# Patient Record
Sex: MALE | Race: WHITE | NOT HISPANIC OR LATINO | Employment: FULL TIME | URBAN - NONMETROPOLITAN AREA
[De-identification: names, ages, dates, MRNs, and addresses within clinical notes are randomized per-mention and may not be internally consistent; named-entity substitution may affect disease eponyms.]

---

## 2017-12-08 RX ORDER — CITALOPRAM 20 MG/1
20 TABLET ORAL DAILY
COMMUNITY
End: 2017-12-11

## 2017-12-08 RX ORDER — FLUTICASONE PROPIONATE 50 MCG
2 SPRAY, SUSPENSION (ML) NASAL DAILY
COMMUNITY
End: 2017-12-18

## 2017-12-11 ENCOUNTER — CONSULT (OUTPATIENT)
Dept: CARDIOLOGY | Facility: CLINIC | Age: 62
End: 2017-12-11

## 2017-12-11 VITALS
DIASTOLIC BLOOD PRESSURE: 62 MMHG | SYSTOLIC BLOOD PRESSURE: 100 MMHG | BODY MASS INDEX: 18.1 KG/M2 | WEIGHT: 141 LBS | OXYGEN SATURATION: 96 % | HEIGHT: 74 IN | HEART RATE: 70 BPM | RESPIRATION RATE: 18 BRPM

## 2017-12-11 DIAGNOSIS — R06.02 SOB (SHORTNESS OF BREATH): ICD-10-CM

## 2017-12-11 DIAGNOSIS — R07.2 PRECORDIAL PAIN: Primary | ICD-10-CM

## 2017-12-11 PROCEDURE — 99203 OFFICE O/P NEW LOW 30 MIN: CPT | Performed by: INTERNAL MEDICINE

## 2017-12-11 RX ORDER — ASPIRIN 81 MG/1
TABLET ORAL DAILY
COMMUNITY
Start: 2017-11-18

## 2017-12-11 NOTE — PROGRESS NOTES
"    Subjective:     Encounter Date:12/11/2017      Patient ID: Jered Escamilla is a 62 y.o. male.    Chief Complaint:Chest pain and shortness of breath  HPI  This is a 62-year-old male patient with no prior history of documented heart disease who presents to cardiology clinic complaining of an episode of prolonged chest discomfort occurring 3 weeks ago.  This occurred while at rest while he was at home.  The discomfort was diffusely across his central chest and had a pressure quality.  It did not radiate.  The discomfort had a 6/10 in intensity.  It was present continuously for over 24 hours.  He did not seek medical attention.  It has not occurred since that time.  There was no associated shortness of breath nausea vomiting or diaphoresis.  He has experienced shortness of breath both at rest and with activity.  He is currently smoking one pack of cigarettes per day.  He reports having some dizziness but no palpitations or syncope.  He reports fatigue with lack of energy and poor effort tolerance.  He indicates that doing physical activities leaves him exhausted.  He has no orthopnea PND or lower extremity edema.  He has no personal history of myocardial infarction or coronary revascularization.  His family history is strongly positive for premature coronary disease.  He is a long-term smoker.  He has no personal history of hypertension or hypercholesterolemia.  He has been told that he is a \"borderline diabetic\".  The following portions of the patient's history were reviewed and updated as appropriate: allergies, current medications, past family history, past medical history, past social history, past surgical history and problem  Review of Systems   Constitution: Positive for malaise/fatigue. Negative for chills, diaphoresis, fever, weakness, night sweats, weight gain and weight loss.   HENT: Negative for ear discharge, hearing loss, hoarse voice and nosebleeds.    Eyes: Negative for discharge, double vision, pain " and photophobia.   Cardiovascular: Positive for chest pain and dyspnea on exertion. Negative for claudication, cyanosis, irregular heartbeat, leg swelling, near-syncope, orthopnea, palpitations, paroxysmal nocturnal dyspnea and syncope.   Respiratory: Positive for shortness of breath. Negative for cough, hemoptysis, sputum production and wheezing.    Endocrine: Negative for cold intolerance, heat intolerance, polydipsia, polyphagia and polyuria.   Hematologic/Lymphatic: Negative for adenopathy and bleeding problem. Does not bruise/bleed easily.   Skin: Negative for color change, flushing, itching and rash.   Musculoskeletal: Negative for muscle cramps, muscle weakness, myalgias and stiffness.   Gastrointestinal: Negative for abdominal pain, diarrhea, hematemesis, hematochezia, nausea and vomiting.   Genitourinary: Negative for dysuria, frequency and nocturia.   Neurological: Positive for dizziness. Negative for focal weakness, loss of balance, numbness, paresthesias and seizures.   Psychiatric/Behavioral: Negative for altered mental status, hallucinations and suicidal ideas.   Allergic/Immunologic: Negative for HIV exposure, hives and persistent infections.       Current Outpatient Prescriptions:   •  ASPIRIN ADULT LOW STRENGTH 81 MG EC tablet, Daily., Disp: , Rfl:   •  Vilazodone HCl (VIIBRYD PO), Take  by mouth 2 (Two) Times a Day., Disp: , Rfl:   •  fluticasone (FLONASE) 50 MCG/ACT nasal spray, 2 sprays into each nostril Daily., Disp: , Rfl:      Objective:     Physical Exam   Constitutional: He is oriented to person, place, and time. He appears well-developed and well-nourished.   HENT:   Head: Normocephalic and atraumatic.   Mouth/Throat: Oropharynx is clear and moist.   Eyes: Conjunctivae and EOM are normal. Pupils are equal, round, and reactive to light. No scleral icterus.   Neck: Normal range of motion. Neck supple. No JVD present. No tracheal deviation present. No thyromegaly present.   Cardiovascular:  "Normal rate, regular rhythm, S1 normal, S2 normal, normal heart sounds, intact distal pulses and normal pulses.  PMI is not displaced.  Exam reveals no gallop and no friction rub.    No murmur heard.  Pulmonary/Chest: Effort normal and breath sounds normal. No respiratory distress. He has no wheezes. He has no rales.   Abdominal: Soft. Bowel sounds are normal. He exhibits no distension and no mass. There is no tenderness. There is no rebound and no guarding.   Musculoskeletal: Normal range of motion. He exhibits no edema or deformity.   Neurological: He is alert and oriented to person, place, and time. He displays normal reflexes. No cranial nerve deficit. Coordination normal.   Skin: Skin is warm and dry. No rash noted. No erythema.   Psychiatric: He has a normal mood and affect. His behavior is normal. Thought content normal.     Blood pressure 100/62, pulse 70, resp. rate 18, height 188 cm (74\"), weight 64 kg (141 lb), SpO2 96 %.   Lab Review:       Assessment:         1. Precordial pain  The patient's chest discomfort has features mostly atypical for coronary insufficiency.  He does have multiple risk factors for coronary artery disease.  The patient is unable to do treadmill exercise stress testing due to severe shortness of breath and effort intolerance.  - Stress Test With Myocardial Perfusion (1 Day)  - Adult Transthoracic Echo Complete W/ Cont if Necessary Per Protocol    2. SOB (shortness of breath)  His shortness of breath is multifactorial in etiology.  Some is certainly related to his ongoing tobacco abuse as well as tobacco-related emphysema.  This could also represent unrecognized congestive heart failure or could be an angina equivalent.  - Stress Test With Myocardial Perfusion (1 Day)  - Adult Transthoracic Echo Complete W/ Cont if Necessary Per Protocol  Procedures     Plan:       I have recommended a vasodilator nuclear stress test as well as an echocardiogram.  No changes in his medication " therapy have been made at today's visit.  The patient has been counseled regarding the essential need to discontinue cigarette smoking.  Further recommendations will be predicated on the results of his outpatient testing.

## 2017-12-18 ENCOUNTER — PREP FOR SURGERY (OUTPATIENT)
Dept: OTHER | Facility: HOSPITAL | Age: 62
End: 2017-12-18

## 2017-12-18 ENCOUNTER — OFFICE VISIT (OUTPATIENT)
Dept: GASTROENTEROLOGY | Facility: CLINIC | Age: 62
End: 2017-12-18

## 2017-12-18 VITALS
DIASTOLIC BLOOD PRESSURE: 66 MMHG | HEIGHT: 74 IN | SYSTOLIC BLOOD PRESSURE: 111 MMHG | RESPIRATION RATE: 16 BRPM | HEART RATE: 60 BPM | WEIGHT: 146 LBS | TEMPERATURE: 99.4 F | BODY MASS INDEX: 18.74 KG/M2

## 2017-12-18 DIAGNOSIS — Z12.11 COLON CANCER SCREENING: ICD-10-CM

## 2017-12-18 DIAGNOSIS — R19.7 DIARRHEA, UNSPECIFIED TYPE: Primary | Chronic | ICD-10-CM

## 2017-12-18 DIAGNOSIS — R10.13 EPIGASTRIC PAIN: Primary | ICD-10-CM

## 2017-12-18 DIAGNOSIS — R10.13 EPIGASTRIC PAIN: Chronic | ICD-10-CM

## 2017-12-18 DIAGNOSIS — R19.7 DIARRHEA, UNSPECIFIED TYPE: Primary | ICD-10-CM

## 2017-12-18 LAB
BH CV ECHO MEAS - % IVS THICK: 25 %
BH CV ECHO MEAS - % LVPW THICK: 25 %
BH CV ECHO MEAS - AO MAX PG (FULL): 0.91 MMHG
BH CV ECHO MEAS - AO MAX PG: 5 MMHG
BH CV ECHO MEAS - AO MEAN PG (FULL): 1.5 MMHG
BH CV ECHO MEAS - AO MEAN PG: 2.5 MMHG
BH CV ECHO MEAS - AO ROOT AREA (BSA CORRECTED): 1.5
BH CV ECHO MEAS - AO ROOT AREA: 6.6 CM^2
BH CV ECHO MEAS - AO ROOT DIAM: 2.9 CM
BH CV ECHO MEAS - AO V2 MAX: 106.5 CM/SEC
BH CV ECHO MEAS - AO V2 MEAN: 68.4 CM/SEC
BH CV ECHO MEAS - AO V2 VTI: 20.8 CM
BH CV ECHO MEAS - AVA(I,A): 3 CM^2
BH CV ECHO MEAS - AVA(I,D): 3 CM^2
BH CV ECHO MEAS - AVA(V,A): 3.6 CM^2
BH CV ECHO MEAS - AVA(V,D): 3.6 CM^2
BH CV ECHO MEAS - BSA(HAYCOCK): 1.8 M^2
BH CV ECHO MEAS - BSA: 1.9 M^2
BH CV ECHO MEAS - BZI_BMI: 18.1 KILOGRAMS/M^2
BH CV ECHO MEAS - BZI_METRIC_HEIGHT: 188 CM
BH CV ECHO MEAS - BZI_METRIC_WEIGHT: 64 KG
BH CV ECHO MEAS - CONTRAST EF 4CH: 69.4 ML/M^2
BH CV ECHO MEAS - EDV(CUBED): 117.6 ML
BH CV ECHO MEAS - EDV(MOD-SP4): 121 ML
BH CV ECHO MEAS - EDV(TEICH): 112.8 ML
BH CV ECHO MEAS - EF(CUBED): 73.4 %
BH CV ECHO MEAS - EF(MOD-SP4): 69.4 %
BH CV ECHO MEAS - EF(TEICH): 65.1 %
BH CV ECHO MEAS - ESV(CUBED): 31.3 ML
BH CV ECHO MEAS - ESV(MOD-SP4): 37 ML
BH CV ECHO MEAS - ESV(TEICH): 39.4 ML
BH CV ECHO MEAS - FS: 35.7 %
BH CV ECHO MEAS - IVS/LVPW: 1.3
BH CV ECHO MEAS - IVSD: 1 CM
BH CV ECHO MEAS - IVSS: 1.3 CM
BH CV ECHO MEAS - LA DIMENSION: 3.4 CM
BH CV ECHO MEAS - LA/AO: 1.2
BH CV ECHO MEAS - LV DIASTOLIC VOL/BSA (35-75): 64.6 ML/M^2
BH CV ECHO MEAS - LV IVRT: 0.13 SEC
BH CV ECHO MEAS - LV MASS(C)D: 153 GRAMS
BH CV ECHO MEAS - LV MASS(C)DI: 81.7 GRAMS/M^2
BH CV ECHO MEAS - LV MASS(C)S: 105.6 GRAMS
BH CV ECHO MEAS - LV MASS(C)SI: 56.4 GRAMS/M^2
BH CV ECHO MEAS - LV MAX PG: 4.1 MMHG
BH CV ECHO MEAS - LV MEAN PG: 1 MMHG
BH CV ECHO MEAS - LV SYSTOLIC VOL/BSA (12-30): 19.8 ML/M^2
BH CV ECHO MEAS - LV V1 MAX: 100.5 CM/SEC
BH CV ECHO MEAS - LV V1 MEAN: 49.6 CM/SEC
BH CV ECHO MEAS - LV V1 VTI: 16.2 CM
BH CV ECHO MEAS - LVIDD: 4.9 CM
BH CV ECHO MEAS - LVIDS: 3.2 CM
BH CV ECHO MEAS - LVLD AP4: 8.1 CM
BH CV ECHO MEAS - LVLS AP4: 6.5 CM
BH CV ECHO MEAS - LVOT AREA (M): 3.8 CM^2
BH CV ECHO MEAS - LVOT AREA: 3.8 CM^2
BH CV ECHO MEAS - LVOT DIAM: 2.2 CM
BH CV ECHO MEAS - LVPWD: 0.8 CM
BH CV ECHO MEAS - LVPWS: 1 CM
BH CV ECHO MEAS - MV A MAX VEL: 83.9 CM/SEC
BH CV ECHO MEAS - MV DEC SLOPE: 446.5 CM/SEC^2
BH CV ECHO MEAS - MV DEC TIME: 0.22 SEC
BH CV ECHO MEAS - MV E MAX VEL: 105 CM/SEC
BH CV ECHO MEAS - MV E/A: 1.3
BH CV ECHO MEAS - MV P1/2T MAX VEL: 107 CM/SEC
BH CV ECHO MEAS - MV P1/2T: 70.2 MSEC
BH CV ECHO MEAS - MVA P1/2T LCG: 2.1 CM^2
BH CV ECHO MEAS - MVA(P1/2T): 3.1 CM^2
BH CV ECHO MEAS - PA MAX PG: 7.1 MMHG
BH CV ECHO MEAS - PA V2 MAX: 133 CM/SEC
BH CV ECHO MEAS - RAP SYSTOLE: 10 MMHG
BH CV ECHO MEAS - RVSP: 36 MMHG
BH CV ECHO MEAS - SI(AO): 73.2 ML/M^2
BH CV ECHO MEAS - SI(CUBED): 46.1 ML/M^2
BH CV ECHO MEAS - SI(LVOT): 32.9 ML/M^2
BH CV ECHO MEAS - SI(MOD-SP4): 44.8 ML/M^2
BH CV ECHO MEAS - SI(TEICH): 39.2 ML/M^2
BH CV ECHO MEAS - SV(AO): 137.1 ML
BH CV ECHO MEAS - SV(CUBED): 86.4 ML
BH CV ECHO MEAS - SV(LVOT): 61.6 ML
BH CV ECHO MEAS - SV(MOD-SP4): 84 ML
BH CV ECHO MEAS - SV(TEICH): 73.4 ML
BH CV ECHO MEAS - TR MAX VEL: 255 CM/SEC
BH CV ECHO MEAS - TV MAX PG: 1.4 MMHG
BH CV ECHO MEAS - TV V2 MAX: 59.8 CM/SEC
LV EF 2D ECHO EST: 69 %

## 2017-12-18 PROCEDURE — 99214 OFFICE O/P EST MOD 30 MIN: CPT | Performed by: NURSE PRACTITIONER

## 2017-12-18 RX ORDER — SODIUM CHLORIDE 9 MG/ML
70 INJECTION, SOLUTION INTRAVENOUS CONTINUOUS PRN
Status: CANCELLED | OUTPATIENT
Start: 2017-12-18

## 2017-12-18 RX ORDER — RANITIDINE 150 MG/1
150 CAPSULE ORAL 2 TIMES DAILY
Qty: 60 CAPSULE | Refills: 3 | Status: SHIPPED | OUTPATIENT
Start: 2017-12-18 | End: 2018-01-17

## 2017-12-18 NOTE — PROGRESS NOTES
Chief Complaint   Patient presents with   • Diarrhea   • Abdominal Pain     The patient has a history of diarrhea for the past year or so. The patient is having 5-6 bowel movements per day. Stools are described as watery. He has taken Viberzi for the diarrhea with no improvement in the past. Currently, he is not taking anything for the diarrhea. Diarrhea does wake him at night at times. Eating does not affect the diarrhea.    The patient has a history of epigastric pain for the past 5 years or so, but the symptoms have been getting worse over the past 1 month. The pain is daily. It is a sensation of fullness. The pain can be severe at times. Eating does not necessarily affect the pain. The patient is not taking anything for the pain.    The patient denies heartburn. There is no history of difficulty swallowing. The patient denies constipation. There is no history of bright red blood per rectum or melena. The patient's last colonoscopy was in July 2016 by Dr. Felix, surgical services. Unfortunately, we do not have these results. There is no family history of colon cancer.    Diarrhea    This is a chronic problem. The current episode started more than 1 year ago. Episode frequency: 5-6 episodes per day. The problem has been unchanged. The stool consistency is described as watery. The patient states that diarrhea awakens him from sleep. Associated symptoms include abdominal pain and arthralgias. Pertinent negatives include no chills, coughing, fever, headaches, myalgias or vomiting. Nothing aggravates the symptoms. There are no known risk factors. Treatments tried: Viberzi. The treatment provided no relief.   Abdominal Pain   This is a chronic problem. Episode onset: over 5 years. The onset quality is sudden. The problem occurs daily. The problem has been unchanged. The pain is located in the epigastric region. The pain is severe. The quality of the pain is a sensation of fullness. The abdominal pain does not radiate.  Associated symptoms include arthralgias and diarrhea. Pertinent negatives include no constipation, dysuria, fever, headaches, hematuria, myalgias, nausea or vomiting. Nothing aggravates the pain. The pain is relieved by nothing. He has tried nothing for the symptoms.     Review of Systems   Constitutional: Negative for appetite change, chills, fatigue, fever and unexpected weight change.   HENT: Negative for mouth sores, nosebleeds and trouble swallowing.    Eyes: Negative for discharge and redness.   Respiratory: Negative for apnea, cough and shortness of breath.    Cardiovascular: Negative for chest pain, palpitations and leg swelling.   Gastrointestinal: Positive for abdominal pain and diarrhea. Negative for abdominal distention, anal bleeding, blood in stool, constipation, nausea and vomiting.   Endocrine: Negative for cold intolerance, heat intolerance and polydipsia.   Genitourinary: Negative for dysuria, hematuria and urgency.   Musculoskeletal: Positive for arthralgias, back pain and neck pain. Negative for joint swelling and myalgias.   Skin: Negative for rash.   Allergic/Immunologic: Negative for food allergies and immunocompromised state.   Neurological: Negative for dizziness, seizures, syncope and headaches.   Hematological: Negative for adenopathy. Does not bruise/bleed easily.   Psychiatric/Behavioral: Negative for dysphoric mood. The patient is not nervous/anxious and is not hyperactive.      Patient Active Problem List   Diagnosis   • Precordial pain   • SOB (shortness of breath)   • Diarrhea   • Epigastric pain     Past Medical History:   Diagnosis Date   • Anemia    • Anxiety    • Arthritis    • Hyperlipidemia    • Insomnia      Past Surgical History:   Procedure Laterality Date   • CARDIAC CATHETERIZATION      7-8 yrs ago in [place]   • COLONOSCOPY  07/2016   • ENDOSCOPY  07/2016   • UPPER GASTROINTESTINAL ENDOSCOPY  07/2016     Family History   Problem Relation Age of Onset   • Hypertension  "Mother    • Diabetes Mother    • Heart attack Father    • Cancer Sister    • Heart attack Brother    • Hypertension Brother    • Diabetes Brother    • Cancer Brother    • Cancer Brother    • Cancer Brother    • Stomach cancer Cousin      Social History   Substance Use Topics   • Smoking status: Current Every Day Smoker     Packs/day: 2.00     Types: Cigarettes   • Smokeless tobacco: Never Used   • Alcohol use No       Current Outpatient Prescriptions:   •  ASPIRIN ADULT LOW STRENGTH 81 MG EC tablet, Daily., Disp: , Rfl:   •  Vilazodone HCl (VIIBRYD PO), Take  by mouth 2 (Two) Times a Day., Disp: , Rfl:   •  ranitidine (ZANTAC) 150 MG capsule, Take 1 capsule by mouth 2 (Two) Times a Day for 30 days., Disp: 60 capsule, Rfl: 3    Allergies   Allergen Reactions   • Amoxil [Amoxicillin] Hives     /66  Pulse 60  Temp 99.4 °F (37.4 °C)  Resp 16  Ht 188 cm (74\")  Wt 66.2 kg (146 lb)  BMI 18.75 kg/m2    Physical Exam   Constitutional: He is oriented to person, place, and time. He appears well-developed and well-nourished. No distress.   HENT:   Head: Normocephalic and atraumatic.   Right Ear: Hearing and external ear normal.   Left Ear: Hearing and external ear normal.   Nose: Nose normal.   Mouth/Throat: Oropharynx is clear and moist and mucous membranes are normal. Mucous membranes are not pale, not dry and not cyanotic. No oral lesions. No oropharyngeal exudate.   Eyes: Conjunctivae and EOM are normal. Right eye exhibits no discharge. Left eye exhibits no discharge.   Neck: Trachea normal. Neck supple. No JVD present. No edema present. No thyroid mass and no thyromegaly present.   Cardiovascular: Normal rate, regular rhythm, S2 normal and normal heart sounds.  Exam reveals no gallop, no S3 and no friction rub.    No murmur heard.  Pulmonary/Chest: Effort normal and breath sounds normal. No respiratory distress. He exhibits no tenderness.   Abdominal: Normal appearance and bowel sounds are normal. He exhibits " no distension, no ascites and no mass. There is no splenomegaly or hepatomegaly. There is no tenderness. There is no rigidity, no rebound and no guarding. No hernia.       Vascular Status -  His exam exhibits no right foot edema. His exam exhibits no left foot edema.  Lymphadenopathy:     He has no cervical adenopathy.        Left: No supraclavicular adenopathy present.   Neurological: He is alert and oriented to person, place, and time. He has normal strength. No cranial nerve deficit or sensory deficit.   Skin: No rash noted. He is not diaphoretic. No cyanosis. No pallor. Nails show no clubbing.   Psychiatric: He has a normal mood and affect.   Nursing note and vitals reviewed.  Stigmata of chronic liver disease:  None.  Asterixis:  None.    Laboratory Results:  Upon review of records:    Dated 11/6/2017 glucose 85 BUN 16 creatinine 0.6 sodium 142 potassium 4.8 chloride 101 CO2 23 calcium 9.3 albumin 3.8 total bilirubin 0.3 alkaline phosphatase 87 AST 19 ALT 16 WBC 5.2 hemoglobin 13.6 hematocrit 41.8 platelet count 281 MCV 91 vitamin B12 465 folate 8.4 TSH 1.238 hemoglobin A1c 5.6 vitamin D 22.3    Assessment and Plan:    Jered was seen today for diarrhea and abdominal pain.    Diagnoses and all orders for this visit:    Diarrhea, unspecified type  Comments:  Differentials include microscopic colitis, underlying inflammatory bowel disease.    Epigastric pain  Comments:  Differentials include peptic ulcer disease, pancreatobiliary disease.  Orders:  -     ranitidine (ZANTAC) 150 MG capsule; Take 1 capsule by mouth 2 (Two) Times a Day for 30 days.    Colon cancer screening  Comments:  Last colonoscopy was in July 2016. Unfortunately, we do not have records. Contacted [facility], they do not have records of colonoscopy.        Plan  and Patient Instructions:  Patient Instructions   1. Antireflux measures: Avoid fried, fatty foods, alcohol, chocolate, coffee, tea,  soft drinks, peppermint and spearmint, spicy  foods, tomatoes and tomato based foods, onion based foods, and smoking. Other antireflux measures include weight reduction if overweight, avoiding tight clothing around the abdomen, elevating the head of the bed 6 inches with blocks under the head board, and don't drink or eat before going to bed and avoid lying down immediately after meals.  2. Ranitidine 150 mg 1 po twice a day.  3. Upper endoscopy-EGD: Description of the procedure, risks, benefits, alternatives and options, including nonoperative options, were discussed with the patient in detail. The patient understands and wishes to proceed.  4. Colonoscopy: Description of the procedure, risks, benefits, alternatives and options, including nonoperative options, were discussed with the patient in detail. The patient understands and wishes to proceed.    Gabby Hayward, APRN

## 2017-12-18 NOTE — PATIENT INSTRUCTIONS
1. Antireflux measures: Avoid fried, fatty foods, alcohol, chocolate, coffee, tea,  soft drinks, peppermint and spearmint, spicy foods, tomatoes and tomato based foods, onion based foods, and smoking. Other antireflux measures include weight reduction if overweight, avoiding tight clothing around the abdomen, elevating the head of the bed 6 inches with blocks under the head board, and don't drink or eat before going to bed and avoid lying down immediately after meals.  2. Ranitidine 150 mg 1 po twice a day.  3. Upper endoscopy-EGD: Description of the procedure, risks, benefits, alternatives and options, including nonoperative options, were discussed with the patient in detail. The patient understands and wishes to proceed.  4. Colonoscopy: Description of the procedure, risks, benefits, alternatives and options, including nonoperative options, were discussed with the patient in detail. The patient understands and wishes to proceed.

## 2017-12-19 PROBLEM — Z12.11 COLON CANCER SCREENING: Status: ACTIVE | Noted: 2017-12-19

## 2018-01-30 ENCOUNTER — TELEPHONE (OUTPATIENT)
Dept: GASTROENTEROLOGY | Facility: CLINIC | Age: 63
End: 2018-01-30

## 2018-01-31 RX ORDER — SULFAMETHOXAZOLE AND TRIMETHOPRIM 800; 160 MG/1; MG/1
1 TABLET ORAL 2 TIMES DAILY
COMMUNITY
End: 2018-02-19

## 2018-01-31 RX ORDER — RANITIDINE 150 MG/1
150 TABLET ORAL NIGHTLY
COMMUNITY
End: 2018-02-05 | Stop reason: HOSPADM

## 2018-02-05 ENCOUNTER — HOSPITAL ENCOUNTER (OUTPATIENT)
Facility: HOSPITAL | Age: 63
Setting detail: HOSPITAL OUTPATIENT SURGERY
Discharge: HOME | End: 2018-02-05
Attending: INTERNAL MEDICINE | Admitting: INTERNAL MEDICINE

## 2018-02-05 ENCOUNTER — ANESTHESIA (OUTPATIENT)
Dept: GASTROENTEROLOGY | Facility: HOSPITAL | Age: 63
End: 2018-02-05

## 2018-02-05 ENCOUNTER — ANESTHESIA EVENT (OUTPATIENT)
Dept: GASTROENTEROLOGY | Facility: HOSPITAL | Age: 63
End: 2018-02-05

## 2018-02-05 VITALS
OXYGEN SATURATION: 98 % | TEMPERATURE: 97.2 F | HEIGHT: 74 IN | WEIGHT: 145 LBS | RESPIRATION RATE: 18 BRPM | BODY MASS INDEX: 18.61 KG/M2 | SYSTOLIC BLOOD PRESSURE: 112 MMHG | DIASTOLIC BLOOD PRESSURE: 63 MMHG | HEART RATE: 62 BPM

## 2018-02-05 DIAGNOSIS — R19.7 DIARRHEA, UNSPECIFIED TYPE: ICD-10-CM

## 2018-02-05 DIAGNOSIS — Z12.11 COLON CANCER SCREENING: ICD-10-CM

## 2018-02-05 LAB — GLUCOSE BLDC GLUCOMTR-MCNC: 89 MG/DL (ref 70–130)

## 2018-02-05 PROCEDURE — 45385 COLONOSCOPY W/LESION REMOVAL: CPT | Performed by: INTERNAL MEDICINE

## 2018-02-05 PROCEDURE — 43239 EGD BIOPSY SINGLE/MULTIPLE: CPT | Performed by: INTERNAL MEDICINE

## 2018-02-05 PROCEDURE — 45380 COLONOSCOPY AND BIOPSY: CPT | Performed by: INTERNAL MEDICINE

## 2018-02-05 PROCEDURE — 82962 GLUCOSE BLOOD TEST: CPT

## 2018-02-05 PROCEDURE — S0260 H&P FOR SURGERY: HCPCS | Performed by: INTERNAL MEDICINE

## 2018-02-05 PROCEDURE — 25010000002 PROPOFOL 10 MG/ML EMULSION: Performed by: NURSE ANESTHETIST, CERTIFIED REGISTERED

## 2018-02-05 RX ORDER — SODIUM CHLORIDE 9 MG/ML
70 INJECTION, SOLUTION INTRAVENOUS CONTINUOUS PRN
Status: DISCONTINUED | OUTPATIENT
Start: 2018-02-05 | End: 2018-02-05 | Stop reason: HOSPADM

## 2018-02-05 RX ORDER — PANTOPRAZOLE SODIUM 40 MG/1
TABLET, DELAYED RELEASE ORAL
Qty: 30 TABLET | Refills: 2 | Status: SHIPPED | OUTPATIENT
Start: 2018-02-05

## 2018-02-05 RX ORDER — PROPOFOL 10 MG/ML
VIAL (ML) INTRAVENOUS AS NEEDED
Status: DISCONTINUED | OUTPATIENT
Start: 2018-02-05 | End: 2018-02-05 | Stop reason: SURG

## 2018-02-05 RX ORDER — ONDANSETRON 2 MG/ML
4 INJECTION INTRAMUSCULAR; INTRAVENOUS ONCE AS NEEDED
Status: CANCELLED | OUTPATIENT
Start: 2018-02-05 | End: 2018-02-05

## 2018-02-05 RX ADMIN — PROPOFOL 50 MG: 10 INJECTION, EMULSION INTRAVENOUS at 12:10

## 2018-02-05 RX ADMIN — PROPOFOL 50 MG: 10 INJECTION, EMULSION INTRAVENOUS at 11:57

## 2018-02-05 RX ADMIN — PROPOFOL 50 MG: 10 INJECTION, EMULSION INTRAVENOUS at 12:05

## 2018-02-05 RX ADMIN — PROPOFOL 50 MG: 10 INJECTION, EMULSION INTRAVENOUS at 12:03

## 2018-02-05 RX ADMIN — LIDOCAINE HYDROCHLORIDE 100 MG: 20 INJECTION, SOLUTION INTRAVENOUS at 11:39

## 2018-02-05 RX ADMIN — PROPOFOL 50 MG: 10 INJECTION, EMULSION INTRAVENOUS at 12:20

## 2018-02-05 RX ADMIN — PROPOFOL 50 MG: 10 INJECTION, EMULSION INTRAVENOUS at 11:44

## 2018-02-05 RX ADMIN — PROPOFOL 100 MG: 10 INJECTION, EMULSION INTRAVENOUS at 11:39

## 2018-02-05 RX ADMIN — PROPOFOL 50 MG: 10 INJECTION, EMULSION INTRAVENOUS at 11:50

## 2018-02-05 RX ADMIN — SODIUM CHLORIDE 70 ML/HR: 9 INJECTION, SOLUTION INTRAVENOUS at 09:15

## 2018-02-05 RX ADMIN — PROPOFOL 50 MG: 10 INJECTION, EMULSION INTRAVENOUS at 12:15

## 2018-02-05 NOTE — OP NOTE
PROCEDURE:  Upper Endoscopy with biopsies.    DATE OF PROCEDURE: February 5, 2018.    REFERRING PROVIDER:  TABITHA Schneider.     INSTRUMENT: Olympus GIF H 190 video endoscope     INDICATIONS OF THE PROCEDURE:  This is a 62-year-old white male with recurrent epigastric abdominal pain.       BIOPSIES: Second portion of duodenum.  Gastric antrum, angularis and body of the stomach.       MEDICATIONS:  MAC.     PHOTOGRAPHS:  Photographs were included in the medical records.     CONSENT/PREPROCEDURE EVALUATION:  Risks, benefits, alternatives and options of the procedure including risks of anesthesia/sedation were discussed and informed consent was obtained prior to the procedure. History and physical examination were performed and nothing precluded the test.     REPORT:  The patient was placed in left lateral decubitus position. Once under the influence of IV sedation, the instrument was inserted into the mouth and esophagus was intubated under direct vision without difficulty.    A small 3 mm polypoid area was noted in the right pyriform sinus.     Esophagus:  Z line was noted to be around 46 cm.    A small sliding hiatal hernia less than 3 cm was noted.  No Lind's esophagus was seen. Lower esophageal sphincter area was noted to be spastic. On persistent insufflation this finally opens.     Stomach:  Antrum:  Erythematous gastritis.  Angulus, lesser and greater curves: Normal.  Retroflex examination: Sliding hiatal hernia.  Cardia and fundus:  Normal.     Body of the stomach: Erythematous-erosive gastritis.  Good distensibility of the stomach was achieved no giant folds were noted.  Nodularity was noted at the upper body.  Biopsies were obtained from the gastric antrum, angularis and body of the stomach.    Pylorus and pyloric channel:  normal.     Duodenum:  Bulb: Normal.  Second portion: normal.  No scalloping was seen in the second portion of duodenum.  Biopsies were obtained from the second portion of  duodenum.    Intervention:  None.       The upper GI tract was decompressed and the scope was pulled out of the patient. The patient tolerated the procedure well.     DIAGNOSES:     1. Small 2 mm polypoid area in the right performed sinus.  2. Small sliding hiatal hernia less than 3 cm.  3. Erythematous-erosive gastritis with nodularity at the upper body.  4. Good distensibility of the stomach was achieved. No giant folds were noted.  5. Lower esophageal sphincter area was noted to be spastic. On persistent insufflation was finally opens.    RECOMMENDATIONS:  1.  Dietary instructions.  2.  A trial of Pantoprazole 40 mg 1 p.o. q.a.m. 1/2 hour before breakfast. Hold Zantac.  3.  Follow biopsies.  4.  Follow up in office.   5. Possible further evaluation by ENT-small polyp in the right pyriform sinus.       Thank you very much for letting me participate in the care of this patient. Please do not hesitate to call me if you have any questions.

## 2018-02-05 NOTE — ANESTHESIA POSTPROCEDURE EVALUATION
Patient: Jered Escamilla    Procedure Summary     Date Anesthesia Start Anesthesia Stop Room / Location    02/05/18 1132 1239 [facility] ENDOSCOPY 2 / [facility] ENDOSCOPY       Procedure Diagnosis Surgeon Provider    COLONOSCOPY with cold snare polypectomy, cold biopsy polypectomy, and  biopsies (N/A Anus); ESOPHAGOGASTRODUODENOSCOPY with biopsies (N/A Esophagus) Diverticulosis of colon; Angiodysplasia; Colon polyp; Internal hemorrhoids; Gastritis; Hiatal hernia  (Colon cancer screening [Z12.11]; Diarrhea, unspecified type [R19.7]) MD Neville Luis CRNA          Anesthesia Type: MAC  Last vitals  BP   105/63 (02/05/18 1244)   Temp   97.2 °F (36.2 °C) (02/05/18 1244)   Pulse   59 (02/05/18 1244)   Resp   18 (02/05/18 1244)     SpO2   99 % (02/05/18 1244)     Post Anesthesia Care and Evaluation    Patient location during evaluation: PACU  Patient participation: complete - patient participated  Level of consciousness: awake  Pain score: 1  Pain management: adequate  Airway patency: patent  Anesthetic complications: No anesthetic complications  PONV Status: controlled  Cardiovascular status: acceptable and stable  Respiratory status: acceptable and nasal cannula  Hydration status: acceptable

## 2018-02-05 NOTE — ANESTHESIA PREPROCEDURE EVALUATION
Anesthesia Evaluation     Patient summary reviewed and Nursing notes reviewed   no history of anesthetic complications:  NPO Solid Status: > 8 hours  NPO Liquid Status: > 8 hours     Airway   Mallampati: I  TM distance: >3 FB  Neck ROM: full  no difficulty expected  Dental - normal exam   (+) edentulous    Pulmonary - normal exam    breath sounds clear to auscultation  (+) a smoker Current Smoked day of surgery, shortness of breath,   Cardiovascular - negative cardio ROS and normal exam    ECG reviewed  Rhythm: regular  Rate: normal      ROS comment: ECHO Dec 2017  · Mild tricuspid valve regurgitation is present.  · Left ventricular systolic function is normal. Estimated EF = 69%  EKG SB    Neuro/Psych  (+) psychiatric history Anxiety and Depression,     GI/Hepatic/Renal/Endo - negative ROS     Musculoskeletal     Abdominal    Substance History - negative use     OB/GYN negative ob/gyn ROS         Other   (+) arthritis     ROS/Med Hx Other: fsbs 89                                        Anesthesia Plan    ASA 3     MAC   (Pt told that intravenous sedation will be used as the primary anesthetic. Every effort will be made to make sure the patient is comfortable.     The patient was told they may or may not have recall for the procedure.  Will proceed with the plan of care.)  intravenous induction   Anesthetic plan and risks discussed with patient.

## 2018-02-05 NOTE — OP NOTE
PROCEDURE:  Colonoscopy to the terminal ileum with one cold snare polypectomy and biopsies.     DATE OF PROCEDURE:  February 5, 2018    REFERRING PROVIDER:  TABITHA Schneider     INSTRUMENT USED: Olympus PCF H 190 videocolonoscope.      INDICATIONS OF THE PROCEDURE:  This is a 62-year-old white male for colon cancer screening. There is history of recurrent diarrhea.      BIOPSIES: Biopsies were obtained from the terminal ileum. Random biopsies were obtained from the colon including rectum. Multiple biopsies were obtained from submucosal fullness. Small polyp in the rectum.         PHOTOGRAPHS:  Photographs were included in the medical records.     MEDICATIONS:  MAC.       CONSENT/PREPROCEDURE EVALUATION:  Risks, benefits, alternatives and options of the procedure including risks of sedation/anesthesia were discussed with the patient and informed consent was obtained prior to the procedure.  History and physical examination were performed and nothing precluded the test.      REPORT:  The patient was placed in left lateral decubitus position and a digital examination was performed.  Once under the influence of IV sedation, the instrument was inserted into the rectum and advanced under direct vision to cecum which was identified by the ileocecal valve, triradiate folds and appendiceal orifice. The scope was then maneuvered into the terminal ileum.        FINDINGS:      Digital rectal examination:  Good anal tone.  No perianal pathology.  No mass.        Terminal ileum:  7-8 cm.   Mucosa appeared to be flat. Biopsies were obtained.      Cecum and ascending colon: 1 cm submucosal fullness was noted in the cecum at the appendicular area. This was palpated with biopsy forceps and appeared to be soft. Multiple biopsies were obtained in this area. Nonbleeding vascular ectasia were seen.         Hepatic flexure, transverse colon, splenic flexure:  Nonbleeding vascular ectasia were seen.  Normal.         Descending  colon, sigmoid colon and rectum:  Diverticulosis was noted. A 5 mm sessile polyps in the rectum was removed with cold snare.  No endoscopic evidence of colitis was seen.  Random biopsies were obtained from the colon upon withdrawal of the scope. A retroflex examination within the rectum revealed internal hemorrhoids.        The scope was then straightened, the lower GI tract was decompressed, and the scope was pulled out of the patient.  The patient tolerated the procedure well.  There were no immediate complications and the patient was transferred in stable condition for post procedure observation.      TECHNICAL DATA:   1. [place] prep score: 8 (3+2+3).    2. Anus to cecal time: 7  minutes.  3. Difficulty of examination: 31 Average.    4. Withdrawal time: 8 minutes.  5. Procedure time: 14 minutes  6. Retroflex examination in right colon: Yes.    7. Second look Rectum to cecum with decompression: Yes.    DIAGNOSES:    1. Left-sided diverticulosis.   2. Colon polyp.   3. Nonbleeding vascular ectasia.   4. 1 cm submucosal fullness in the cecum-ad perpendicular area, soft in consistency.  5. Internal hemorrhoids.  6. No endoscopic evidence of colitis was seen.  Random biopsies were obtained from the colon upon withdrawal of the scope.    RECOMMENDATIONS:     1. Dietary instructions.  2. Follow biopsies.    3. Follow-up:    3-4 weeks.    4. Followup colonoscopy:  5 years.          Thank you very much for letting me participate in the care of this patient. Please do not hesitate to call me if you have any questions.

## 2018-02-05 NOTE — PLAN OF CARE
Problem: GI Endoscopy (Adult)  Goal: Signs and Symptoms of Listed Potential Problems Will be Absent or Manageable (GI Endoscopy)  Outcome: Outcome(s) achieved Date Met: 02/05/18 02/05/18 1253   GI Endoscopy   Problems Assessed (GI Endoscopy) all   Problems Present (GI Endoscopy) none

## 2018-02-05 NOTE — DISCHARGE INSTRUCTIONS
"Postprocedure instructions:    Nothing by mouth to fully alert.  Once fully alert may have clear liquid diet.  Advance diet as tolerated.  Vital signs as routine.    Diet:     Avoid Dairy Products.    Blood Thinner Directions:    Avoid Aspirin & other NSAIDS for _7__ days. Tylenol is okay.    Treatments:    May take \"Imodium - AD\" over-the-counter.Take 1/4 tablet at night orally. The dose may be increased to 1/2 tablet at night or even twice a day if needed.   Adjust the dose to have 1-2 soft stools a day.      Other Instructions:    Call [facility] at [phone] or come to the Emergency Department if you experience the following: Chest pain, abdominal pain, bleeding (vomiting of blood or coffee colored material, black stools or miguel blood in stools), fever/chills, nausea and vomiting or dizziness.      Follow-up:  DR. AGUSTINA MAST in 4 weeks.Office phone # [phone].    Follow-up colonoscopy: in 5 years.          To assist you in voiding:  Drink plenty of fluids  Listen to running water while attempting to void.    If you are unable to urinate and you have an uncomfortable urge to void or it has been   6 hours since you were discharged, return to the Emergency Room  "

## 2018-02-05 NOTE — H&P
Chief complaint:  Colon Cancer Screen, Diarrhea, Epigastric pain    History of present illness:     There is no history of:  Nausea, Vomiting, Reflux, Dysphagia, BH Change, Constipation, Hematemesis, Melena, BRBPR, Pancreatic or Liver Disease.    Past medical history:   Past Medical History:   Diagnosis Date   • Anemia    • Anxiety    • Arthritis    • Diabetes mellitus     BOARDERLINE NOT MEDS   • Hyperlipidemia    • Insomnia        Surgical history:    Past Surgical History:   Procedure Laterality Date   • CARDIAC CATHETERIZATION      7-8 yrs ago in [place]     Social history:   ETOH: No  Tobacco Use:  Yes  Other Notes:    Allergies:  Amoxil [amoxicillin]    Latex allergy: None  Contrast allergy: None    Medications:  Prescriptions Prior to Admission   Medication Sig Dispense Refill Last Dose   • ASPIRIN ADULT LOW STRENGTH 81 MG EC tablet Daily.   Past Week at Unknown time   • raNITIdine (ZANTAC) 150 MG tablet Take 150 mg by mouth Every Night.   2/4/2018 at Unknown time   • sulfamethoxazole-trimethoprim (BACTRIM DS,SEPTRA DS) 800-160 MG per tablet Take 1 tablet by mouth 2 (Two) Times a Day.   2/4/2018 at 0800   • Vilazodone HCl (VIIBRYD PO) Take  by mouth 2 (Two) Times a Day.   Taking       Review of systems:   Constitutional: No recent:  Fever, Weight loss or Night sweats, no Glaucoma.  Respiratory: No recent: Hemoptysis, Cough, or Sputum.    No Asthma, COPD or VICKEY.  Cardiovascular: No Recent: Chest Pains, Orthopnea, PND, Palpitations or MI.     No history of: HTN, CAD, MI, CHF, VHD, RHD, PVD, or Arrhythmia.  Endocrine: No history of: DM, Hypothyroidism or Hyperthyroidism.  Genitourinary: No history of: Renal Failure, Kidney Stones, Recent UTI; No BPH.  Musculoskeletal: No history of: RA, Gout, SLE or Fibromyalgia.  Neurological: No history of: Dementia, Migraines, RLS, Recent Seizures, CVA, TIA.   Hem. Oncology: No history of: Known Cancer.  Psychiatric: No history of: Depression.    VITAL SIGNS:    Blood pressure  "123/66, pulse 68, temperature 98.4 °F (36.9 °C), temperature source Temporal Artery , resp. rate 16, height 188 cm (74\"), weight 65.8 kg (145 lb), SpO2 99 %.    PHYSICAL EXAMINATION:   HEENT: Normal.   Lungs: Clear to auscultation.  Heart: No S3, no murmur.    Abdomen: Soft.  BS+ ND, NT  Extremities: No edema.  No cyanosis.  Neuro: Alert X 3. No focal deficit.    Assessment: Colon Cancer Screen, Diarrhea, Epigastric pain    Plan:  Colonoscopy/Upper Endoscopy     Risks/Benefits:  The potential benefits, risk and/or side effects of the procedure and alternatives have been discussed with the patient/authorized representative and questions were answered. \    "

## 2018-02-05 NOTE — PLAN OF CARE
Problem: GI Endoscopy (Adult)  Goal: Signs and Symptoms of Listed Potential Problems Will be Absent or Manageable (GI Endoscopy)  Outcome: Ongoing (interventions implemented as appropriate)   02/05/18 0900   GI Endoscopy   Problems Assessed (GI Endoscopy) all   Problems Present (GI Endoscopy) none

## 2018-02-08 ENCOUNTER — TELEPHONE (OUTPATIENT)
Dept: GASTROENTEROLOGY | Facility: CLINIC | Age: 63
End: 2018-02-08

## 2018-02-08 LAB
LAB AP CASE REPORT: NORMAL
Lab: NORMAL
PATH REPORT.FINAL DX SPEC: NORMAL

## 2018-02-19 ENCOUNTER — OFFICE VISIT (OUTPATIENT)
Dept: GASTROENTEROLOGY | Facility: CLINIC | Age: 63
End: 2018-02-19

## 2018-02-19 VITALS
HEIGHT: 74 IN | DIASTOLIC BLOOD PRESSURE: 112 MMHG | RESPIRATION RATE: 16 BRPM | HEART RATE: 76 BPM | WEIGHT: 142 LBS | SYSTOLIC BLOOD PRESSURE: 134 MMHG | BODY MASS INDEX: 18.22 KG/M2 | TEMPERATURE: 97.9 F

## 2018-02-19 DIAGNOSIS — K63.89 MASS OF CECUM: ICD-10-CM

## 2018-02-19 DIAGNOSIS — Z87.891 HISTORY OF SMOKING: ICD-10-CM

## 2018-02-19 DIAGNOSIS — R59.1 LYMPHADENOPATHY: ICD-10-CM

## 2018-02-19 DIAGNOSIS — K76.9 LIVER LESION: Primary | ICD-10-CM

## 2018-02-19 DIAGNOSIS — R19.7 DIARRHEA, UNSPECIFIED TYPE: ICD-10-CM

## 2018-02-19 DIAGNOSIS — R07.9 CHEST PAIN, UNSPECIFIED TYPE: ICD-10-CM

## 2018-02-19 PROCEDURE — 99215 OFFICE O/P EST HI 40 MIN: CPT | Performed by: INTERNAL MEDICINE

## 2018-02-19 RX ORDER — ONDANSETRON 4 MG/1
TABLET, ORALLY DISINTEGRATING ORAL
Refills: 0 | COMMUNITY
Start: 2018-02-18

## 2018-02-19 RX ORDER — HYDROCODONE BITARTRATE AND ACETAMINOPHEN 7.5; 325 MG/1; MG/1
TABLET ORAL
Refills: 0 | COMMUNITY
Start: 2018-02-18

## 2018-02-19 RX ORDER — CYCLOBENZAPRINE HCL 10 MG
TABLET ORAL
COMMUNITY
Start: 2018-02-16

## 2018-02-19 RX ORDER — GABAPENTIN 300 MG/1
CAPSULE ORAL
COMMUNITY
Start: 2018-02-16

## 2018-02-22 ENCOUNTER — TELEPHONE (OUTPATIENT)
Dept: GASTROENTEROLOGY | Facility: CLINIC | Age: 63
End: 2018-02-22

## 2018-02-22 ENCOUNTER — LAB (OUTPATIENT)
Dept: LAB | Facility: HOSPITAL | Age: 63
End: 2018-02-22
Attending: INTERNAL MEDICINE

## 2018-02-22 ENCOUNTER — HOSPITAL ENCOUNTER (OUTPATIENT)
Dept: CT IMAGING | Facility: HOSPITAL | Age: 63
Discharge: HOME | End: 2018-02-22
Attending: INTERNAL MEDICINE | Admitting: INTERNAL MEDICINE

## 2018-02-22 DIAGNOSIS — R59.1 LYMPHADENOPATHY: ICD-10-CM

## 2018-02-22 DIAGNOSIS — K63.89 MASS OF CECUM: ICD-10-CM

## 2018-02-22 DIAGNOSIS — Z87.891 HISTORY OF SMOKING: ICD-10-CM

## 2018-02-22 DIAGNOSIS — R19.7 DIARRHEA, UNSPECIFIED TYPE: ICD-10-CM

## 2018-02-22 DIAGNOSIS — K76.9 LIVER LESION: ICD-10-CM

## 2018-02-22 LAB
CEA SERPL-MCNC: 2.51 NG/ML
CREAT BLD-MCNC: 0.7 MG/DL (ref 0.6–1.3)
GFR SERPL CREATININE-BSD FRML MDRD: 115 ML/MIN/1.73

## 2018-02-22 PROCEDURE — 0 IOPAMIDOL 61 % SOLUTION: Performed by: INTERNAL MEDICINE

## 2018-02-22 PROCEDURE — 82784 ASSAY IGA/IGD/IGG/IGM EACH: CPT

## 2018-02-22 PROCEDURE — 83516 IMMUNOASSAY NONANTIBODY: CPT

## 2018-02-22 PROCEDURE — 86316 IMMUNOASSAY TUMOR OTHER: CPT

## 2018-02-22 PROCEDURE — 86301 IMMUNOASSAY TUMOR CA 19-9: CPT

## 2018-02-22 PROCEDURE — 71260 CT THORAX DX C+: CPT

## 2018-02-22 PROCEDURE — 82575 CREATININE CLEARANCE TEST: CPT

## 2018-02-22 PROCEDURE — 82378 CARCINOEMBRYONIC ANTIGEN: CPT

## 2018-02-22 PROCEDURE — 36415 COLL VENOUS BLD VENIPUNCTURE: CPT

## 2018-02-22 PROCEDURE — 83497 ASSAY OF 5-HIAA: CPT

## 2018-02-22 PROCEDURE — 81050 URINALYSIS VOLUME MEASURE: CPT

## 2018-02-22 PROCEDURE — 82565 ASSAY OF CREATININE: CPT | Performed by: INTERNAL MEDICINE

## 2018-02-22 RX ADMIN — IOPAMIDOL 100 ML: 612 INJECTION, SOLUTION INTRAVENOUS at 09:00

## 2018-02-22 NOTE — TELEPHONE ENCOUNTER
----- Message from Eugenia Cortes MA sent at 2/22/2018  9:34 AM EST -----  MR COYLE WAS WONDERING IF WE COULD ORDER A LIVER BIOPSY BEFORE HIS FUP NEXT WEEK. HE REALLY WANTS TO GO AHEAD AND GET IT DONE.

## 2018-02-22 NOTE — TELEPHONE ENCOUNTER
Attempted to call patient.  Per Dr. Carr, patient needs to do his CT of the chest and labs and FUP before doing liver biopsy.

## 2018-02-23 LAB
CANCER AG19-9 SERPL-ACNC: 45 U/ML (ref 0–35)
IGA SERPL-MCNC: 517 MG/DL (ref 61–437)
TTG IGA SER-ACNC: <2 U/ML (ref 0–3)

## 2018-02-25 LAB
COLLECT DURATION TIME UR: 24 HRS
CREAT CL 24H UR+SERPL-VRATE: 120.3 ML/MIN (ref 97–137)
CREAT CL 24H UR+SERPL-VRATE: 173.2 L/24 HR (ref 139.7–197.3)
CREAT UR-MCNC: 61 MG/DL
CREATINE 24H UR-MRATE: 1.32 G/24 HR (ref 0.8–2)

## 2018-02-25 PROCEDURE — 83497 ASSAY OF 5-HIAA: CPT

## 2018-02-25 PROCEDURE — 81050 URINALYSIS VOLUME MEASURE: CPT

## 2018-02-25 PROCEDURE — 82575 CREATININE CLEARANCE TEST: CPT

## 2018-02-27 LAB — CGA SERPL-SCNC: 92 NMOL/L (ref 0–5)

## 2018-03-01 ENCOUNTER — OFFICE VISIT (OUTPATIENT)
Dept: GASTROENTEROLOGY | Facility: CLINIC | Age: 63
End: 2018-03-01

## 2018-03-01 VITALS
TEMPERATURE: 97.5 F | RESPIRATION RATE: 16 BRPM | DIASTOLIC BLOOD PRESSURE: 69 MMHG | WEIGHT: 137 LBS | HEART RATE: 77 BPM | BODY MASS INDEX: 17.58 KG/M2 | SYSTOLIC BLOOD PRESSURE: 130 MMHG | HEIGHT: 74 IN

## 2018-03-01 DIAGNOSIS — R10.13 EPIGASTRIC PAIN: ICD-10-CM

## 2018-03-01 DIAGNOSIS — K63.89 MASS OF CECUM: ICD-10-CM

## 2018-03-01 DIAGNOSIS — K76.9 LIVER LESION: ICD-10-CM

## 2018-03-01 DIAGNOSIS — M54.50 ACUTE MIDLINE LOW BACK PAIN WITHOUT SCIATICA: Primary | ICD-10-CM

## 2018-03-01 DIAGNOSIS — R19.7 DIARRHEA, UNSPECIFIED TYPE: ICD-10-CM

## 2018-03-01 DIAGNOSIS — R35.1 NOCTURIA: ICD-10-CM

## 2018-03-01 LAB
5OH-INDOLEACETATE 24H UR-MCNC: 94.1 MG/L
5OH-INDOLEACETATE 24H UR-MRATE: 203.3 MG/24 HR (ref 0–14.9)

## 2018-03-01 PROCEDURE — 99215 OFFICE O/P EST HI 40 MIN: CPT | Performed by: INTERNAL MEDICINE

## 2018-03-01 RX ORDER — FENTANYL 50 UG/H
PATCH TRANSDERMAL
Refills: 0 | COMMUNITY
Start: 2018-02-26

## 2018-03-01 RX ORDER — IBUPROFEN 800 MG/1
TABLET ORAL
COMMUNITY
Start: 2018-02-23

## 2018-03-01 NOTE — PROGRESS NOTES
Chief Complaint   Patient presents with   • Abdominal Pain       History of Present Illness     The patient has a history of epigastric pain for the past 5 years or so, but the symptoms have been getting worse over the past 1 month. The pain is daily. It is a sensation of fullness. The pain can be severe at times. Eating does not necessarily affect the pain. The pain is somewhat improved.    The patient has a history of diarrhea for the past I year. The patient is having 5-6 bowel movements per day. Stools are described as watery. He has taken Viberzi for the diarrhea with no improvement in the past. Currently, he is not taking anything for the diarrhea. Diarrhea does wake him at night at times. Eating does not affect the diarrhea.the diarrhea is also somewhat better. Currently the patient has been having about 3-4 loose stools a day. There is history of 11 pound weight loss over the last 3 months. This is unintentional.     The patient denies heartburn. There is no history of difficulty swallowing. The patient denies constipation. There is no history of bright red blood per rectum or melena. The patient denies fever or chills. There is history of chronic back pain. Recently this has worsened. The patient also complain weakness in his legs.    Review of Systems   Constitutional: Positive for fatigue. Negative for appetite change, chills, fever and unexpected weight change.   HENT: Negative for mouth sores, nosebleeds and trouble swallowing.    Eyes: Negative for discharge and redness.   Respiratory: Negative for apnea, cough and shortness of breath.    Cardiovascular: Negative for chest pain, palpitations and leg swelling.   Gastrointestinal: Positive for abdominal pain and diarrhea. Negative for abdominal distention, anal bleeding, blood in stool, constipation, nausea and vomiting.   Endocrine: Negative for cold intolerance, heat intolerance and polydipsia.   Genitourinary: Negative for dysuria, hematuria and  urgency.   Musculoskeletal: Positive for arthralgias and back pain. Negative for joint swelling and myalgias.   Skin: Negative for rash.   Allergic/Immunologic: Negative for food allergies and immunocompromised state.   Neurological: Negative for dizziness, seizures, syncope and headaches.   Hematological: Negative for adenopathy. Does not bruise/bleed easily.   Psychiatric/Behavioral: Negative for dysphoric mood. The patient is not nervous/anxious and is not hyperactive.      Patient Active Problem List   Diagnosis   • Precordial pain   • SOB (shortness of breath)   • Diarrhea   • Epigastric pain   • Colon cancer screening     Past Medical History:   Diagnosis Date   • Anemia    • Anxiety    • Arthritis    • Diabetes mellitus     BOARDERLINE NOT MEDS   • Hyperlipidemia    • Insomnia      Past Surgical History:   Procedure Laterality Date   • CARDIAC CATHETERIZATION      7-8 yrs ago in [place]   • COLONOSCOPY  07/2016   • COLONOSCOPY N/A 2/5/2018    Procedure: COLONOSCOPY with cold snare polypectomy, cold biopsy polypectomy, and  biopsies;  Surgeon: Wesley Carr MD;  Location: [facility] ENDOSCOPY;  Service:    • ENDOSCOPY  07/2016   • ENDOSCOPY N/A 2/5/2018    Procedure: ESOPHAGOGASTRODUODENOSCOPY with biopsies;  Surgeon: Wesley Carr MD;  Location: [facility] ENDOSCOPY;  Service:    • UPPER GASTROINTESTINAL ENDOSCOPY  07/2016     Family History   Problem Relation Age of Onset   • Hypertension Mother    • Diabetes Mother    • Heart attack Father    • Cancer Sister    • Heart attack Brother    • Hypertension Brother    • Diabetes Brother    • Cancer Brother    • Cancer Brother    • Cancer Brother    • Stomach cancer Cousin      Social History   Substance Use Topics   • Smoking status: Current Every Day Smoker     Packs/day: 2.00     Years: 50.00     Types: Cigarettes   • Smokeless tobacco: Never Used   • Alcohol use No       Current Outpatient Prescriptions:   •  ASPIRIN ADULT LOW STRENGTH 81 MG EC tablet, Daily., Disp:  ", Rfl:   •  cyclobenzaprine (FLEXERIL) 10 MG tablet, , Disp: , Rfl:   •  fentaNYL (DURAGESIC) 50 MCG/HR patch, apply 1 patch every 72 hours, Disp: , Rfl: 0  •  gabapentin (NEURONTIN) 300 MG capsule, , Disp: , Rfl:   •  HYDROcodone-acetaminophen (NORCO) 7.5-325 MG per tablet, TK 1 T PO  Q 4 TO 6 H PRN P, Disp: , Rfl: 0  •  ibuprofen (ADVIL,MOTRIN) 800 MG tablet, , Disp: , Rfl:   •  ondansetron ODT (ZOFRAN-ODT) 4 MG disintegrating tablet, DIS ONE T PO  Q 8 H PRN NV, Disp: , Rfl: 0  •  pantoprazole (PROTONIX) 40 MG EC tablet, Take 1 tablet by mouth 30 minutes before breakfast daily., Disp: 30 tablet, Rfl: 2  •  Vilazodone HCl (VIIBRYD PO), Take  by mouth 2 (Two) Times a Day., Disp: , Rfl:     Allergies   Allergen Reactions   • Amoxil [Amoxicillin] Hives       Blood pressure 130/69, pulse 77, temperature 97.5 °F (36.4 °C), resp. rate 16, height 188 cm (74.02\"), weight 62.1 kg (137 lb).    Physical Exam   Constitutional: He is oriented to person, place, and time. He appears well-developed. He appears distressed.   Mildly distressed due to back pain.   HENT:   Head: Normocephalic and atraumatic.   Right Ear: Hearing and external ear normal.   Left Ear: Hearing and external ear normal.   Nose: Nose normal.   Mouth/Throat: Oropharynx is clear and moist and mucous membranes are normal. Mucous membranes are not pale, not dry and not cyanotic. No oral lesions. No oropharyngeal exudate.   Eyes: Conjunctivae and EOM are normal. Right eye exhibits no discharge. Left eye exhibits no discharge. No scleral icterus.   Neck: Trachea normal. Neck supple. No JVD present. No edema present. No thyroid mass and no thyromegaly present.   Cardiovascular: Normal rate, regular rhythm, S2 normal and normal heart sounds.  Exam reveals no gallop, no S3 and no friction rub.    No murmur heard.  Pulmonary/Chest: Effort normal and breath sounds normal. No respiratory distress. He has no wheezes. He has no rales. He exhibits no tenderness. "   Abdominal: Soft. Normal appearance and bowel sounds are normal. He exhibits no distension, no ascites and no mass. There is no splenomegaly or hepatomegaly. There is no tenderness. There is no rigidity, no rebound and no guarding. No hernia.   Musculoskeletal: He exhibits no tenderness or deformity.     Vascular Status -  His right foot exhibits no edema. His left foot exhibits no edema.  Lymphadenopathy:     He has no cervical adenopathy.        Left: No supraclavicular adenopathy present.   Neurological: He is alert and oriented to person, place, and time. He has normal strength. No cranial nerve deficit or sensory deficit. He exhibits normal muscle tone. Coordination normal.   Skin: No rash noted. He is not diaphoretic. No cyanosis. No pallor. Nails show no clubbing.   Psychiatric: He has a normal mood and affect. His behavior is normal. Judgment and thought content normal.   Nursing note and vitals reviewed.    Stigmata of chronic liver disease:  None.  Asterixis:  None.      Laboratory Testing:  Upon review of medical records:      Dated 11/6/2017 glucose 85 BUN 16 creatinine 0.6 sodium 142 potassium 4.8 chloride 101 CO2 23 calcium 9.3 albumin 3.8 total bilirubin 0.3 alkaline phosphatase 87 AST 19 ALT 16 WBC 5.2 hemoglobin 13.6 hematocrit 41.8 platelet count 281 MCV 91 vitamin B12 465 folate 8.4 TSH 1.238 hemoglobin A1c 5.6 vitamin D 22.3.    Dated February 18, 2018 sodium 140 potassium 3.9 chloride 103 CO2 27 BUN 16 serum creatinine 0.50 glucose 90.  Calcium 9.2.  Total protein 6.5.  Albumin 3.7.  T bili 0.3 AST 19 ALT 23 alkaline phosphatase 91.  WBC 11.8 hemoglobin 12.1 hematocrit 36.1 MCV 88.7 and platelet count 244.    Dated February 22, 2018 Chromogranin A elevated at 92. tTG IgA <2.  IgA quantitative elevated at 517.  CEA level 2.51.  CA-19-9 level elevated at 45.    Dated February 25, 2018 creatinine clearance 120.3 mL/min.  Urine creatinine 61.0 mg/dL.  24 hour creatinine 1.32.  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  (These become hypodense to the enhancing liver on the delayed images).  The other solid abdominal organs and ureters are normal.  There is an approximately 2.8 cm (somewhat hyperdense) nonobstructing mass involving the ileocecal region, but the remainder of the GI tract is unremarkable.  Within the deep small bowel mesentery there are 2 adjacent hyperdense nodes.  The larger is 2.6 cm in diameter.  Urinary bladder is normal.  Small amount of ascites in the pelvis.  No pelvic adenopathy.  Mildly enlarged and slightly hyperdense para-aortic and pericaval nodes are seen in the upper abdomen on images 3335.  Multiple sclerotic lesions are seen within the upper lumbar in the lower thoracic vertebral bodies, consistent with metastatic disease.    Dated February 22, 2018 the patient underwent a CT of the Chest with Contrast which revealed:  Soft tissue windows demonstrate no adenopathy within the chest.  Diffuse wall thickening involving the esophagus from the mid thoracic level distally which may reflect esophagitis.  Heart is normal in size.  Aorta is normal in caliber.  No pericardial or pleural effusion.  Lung windows reveal no suspicious infiltrate or nodules.  Within the visualized upper abdomen there are multiple enhancing lesions within the liver which are nonspecific.  Bone windows reveal no acute osseous abnormalities.    Procedures:  Upon review of medical records:      Dated February 5, 2018 the patient underwent a colonoscopy to the terminal ileum which revealed: Left-sided diverticulosis.  Colon polyp.  Nonbleeding vascular ectasia.  1 cm submucosal fullness in the cecum-a particular area, soft in consistency.   Internal hemorrhoids.  No endoscopic evidence of colitis was seen.  Random biopsies were obtained from the colon upon withdrawal of scope.  Small bowel, terminal ileum, biopsy revealed no tissue surviving processing procedure.  Cecum, and 70,000 prominence, biopsies revealed focal active colitis.  No well-formed crypt abscesses or granulomas are identified.  No other changes are seen.  Rectal polyp, biopsy revealed hyperplastic colonic polyp.  No other changes are identified.  Random colon biopsies revealed benign colonic mucosa, no increased inflammation, hyperplastic changes are either changes are verified.    Dated February 5, 2018 the patient underwent an upper endoscopy which revealed: Small 2 mm polypoid area in the right performed sinus.  Small sliding hiatal hernia less than 3 cm.  Erythematous-erosive gastritis with nodularity at the upper body.  Good distensibility of the stomach was achieved.  No giant folds were noted.  Lower esophageal sphincter area was noted to be spastic.  On persistent insufflation finally opens.  Second portion of duodenum, biopsies revealed unremarkable small bowel mucosa, no increased inflammation or adenomatous changes are identified.  Antrum, body, angulus biopsies revealed chronic gastritis with focal activity.  No atypia or intestinal metaplasia are identified.  Numerous H. pylori organisms are seen on special stain.      Assessment and Plan:      Jered was seen today for abdominal pain.    Diagnoses and all orders for this visit:    Acute midline low back pain without sciatica  -     NM bone scan whole body; Future    Nocturia  -     PSA Screen; Future    Epigastric pain    Diarrhea, unspecified type    Mass of cecum  Comments:  Submucosal prominence in the cecum. CT suggestion of terminal ileal lesion. Possible underlying carcinoid.    Liver lesion  Comments:  Concerns regarding underlying liver metastases.      Plan  and Patient Instructions:    Patient Instructions    1. Check PSA.  2. Bone scan.  3. The patient will need oncology evaluation.  4. Await urine studies for 5 HIAA.  5. The patient may need further imaging of the lower back.   6. Discussed with the patient and family in depth. Multiple questions were answered. Opportunity was given for additional questions.  Total time spent 50 minutes.  40 minutes were spent face-to-face discussion.  7. The patient and the family have been advised to call back to call back around 9 AM regarding the exact time of the bone scan, which we will try to get it done tomorrow.        Wesley Carr MD

## 2018-03-01 NOTE — PATIENT INSTRUCTIONS
1. Check PSA.  2. Bone scan.  3. The patient will need oncology evaluation.  4. Await urine studies for 5 HIAA.  5. The patient may need further imaging of the lower back.   6. Discussed with the patient and family in depth. Multiple questions were answered. Opportunity was given for additional questions.  Total time spent 50 minutes.  40 minutes were spent face-to-face discussion.  7. The patient and the family have been advised to call back to call back around 9 AM regarding the exact time of the bone scan, which we will try to get it done tomorrow.

## 2018-03-02 ENCOUNTER — HOSPITAL ENCOUNTER (EMERGENCY)
Facility: HOSPITAL | Age: 63
Discharge: LEFT BEFORE DISCHARGE | End: 2018-03-02
Attending: EMERGENCY MEDICINE | Admitting: EMERGENCY MEDICINE

## 2018-03-02 ENCOUNTER — APPOINTMENT (OUTPATIENT)
Dept: MRI IMAGING | Facility: HOSPITAL | Age: 63
End: 2018-03-02

## 2018-03-02 ENCOUNTER — HOSPITAL ENCOUNTER (OUTPATIENT)
Dept: NUCLEAR MEDICINE | Facility: HOSPITAL | Age: 63
Discharge: HOME | End: 2018-03-02
Attending: INTERNAL MEDICINE

## 2018-03-02 ENCOUNTER — TELEPHONE (OUTPATIENT)
Dept: GASTROENTEROLOGY | Facility: CLINIC | Age: 63
End: 2018-03-02

## 2018-03-02 VITALS
BODY MASS INDEX: 17.58 KG/M2 | HEART RATE: 77 BPM | OXYGEN SATURATION: 100 % | SYSTOLIC BLOOD PRESSURE: 139 MMHG | HEIGHT: 74 IN | WEIGHT: 137 LBS | RESPIRATION RATE: 18 BRPM | TEMPERATURE: 97.6 F | DIASTOLIC BLOOD PRESSURE: 78 MMHG

## 2018-03-02 DIAGNOSIS — M54.41 CHRONIC MIDLINE LOW BACK PAIN WITH RIGHT-SIDED SCIATICA: Primary | ICD-10-CM

## 2018-03-02 DIAGNOSIS — G89.29 CHRONIC MIDLINE LOW BACK PAIN WITH RIGHT-SIDED SCIATICA: Primary | ICD-10-CM

## 2018-03-02 DIAGNOSIS — M54.50 ACUTE MIDLINE LOW BACK PAIN WITHOUT SCIATICA: ICD-10-CM

## 2018-03-02 DIAGNOSIS — R29.898 RIGHT LEG WEAKNESS: ICD-10-CM

## 2018-03-02 LAB
ALBUMIN SERPL-MCNC: 3.7 G/DL (ref 3.5–5)
ALP SERPL-CCNC: 106 U/L (ref 38–126)
ALT SERPL W P-5'-P-CCNC: 47 U/L (ref 13–69)
ANION GAP SERPL CALCULATED.3IONS-SCNC: 14.2 MMOL/L
AST SERPL-CCNC: 31 U/L (ref 15–46)
BASOPHILS # BLD AUTO: 0.04 10*3/MM3 (ref 0–0.2)
BASOPHILS NFR BLD AUTO: 0.7 % (ref 0–2.5)
BILIRUB CONJ SERPL-MCNC: 0.2 MG/DL (ref 0–0.4)
BILIRUB INDIRECT SERPL-MCNC: 0.1 MG/DL
BILIRUB SERPL-MCNC: 0.3 MG/DL (ref 0.2–1.3)
BUN BLD-MCNC: 17 MG/DL (ref 7–20)
BUN/CREAT SERPL: 21.3 (ref 6.3–21.9)
CALCIUM SPEC-SCNC: 9.2 MG/DL (ref 8.4–10.2)
CHLORIDE SERPL-SCNC: 102 MMOL/L (ref 98–107)
CO2 SERPL-SCNC: 30 MMOL/L (ref 26–30)
CREAT BLD-MCNC: 0.8 MG/DL (ref 0.6–1.3)
DEPRECATED RDW RBC AUTO: 42.9 FL (ref 37–54)
EOSINOPHIL # BLD AUTO: 0.25 10*3/MM3 (ref 0–0.7)
EOSINOPHIL NFR BLD AUTO: 4.1 % (ref 0–7)
ERYTHROCYTE [DISTWIDTH] IN BLOOD BY AUTOMATED COUNT: 13 % (ref 11.5–14.5)
GFR SERPL CREATININE-BSD FRML MDRD: 98 ML/MIN/1.73
GLUCOSE BLD-MCNC: 131 MG/DL (ref 74–98)
HCT VFR BLD AUTO: 37.3 % (ref 42–52)
HGB BLD-MCNC: 12.3 G/DL (ref 14–18)
IMM GRANULOCYTES # BLD: 0.04 10*3/MM3 (ref 0–0.06)
IMM GRANULOCYTES NFR BLD: 0.7 % (ref 0–0.6)
LYMPHOCYTES # BLD AUTO: 1.9 10*3/MM3 (ref 0.6–3.4)
LYMPHOCYTES NFR BLD AUTO: 31.2 % (ref 10–50)
MCH RBC QN AUTO: 29.7 PG (ref 27–31)
MCHC RBC AUTO-ENTMCNC: 33 G/DL (ref 30–37)
MCV RBC AUTO: 90.1 FL (ref 80–94)
MONOCYTES # BLD AUTO: 0.64 10*3/MM3 (ref 0–0.9)
MONOCYTES NFR BLD AUTO: 10.5 % (ref 0–12)
NEUTROPHILS # BLD AUTO: 3.22 10*3/MM3 (ref 2–6.9)
NEUTROPHILS NFR BLD AUTO: 52.8 % (ref 37–80)
NRBC BLD MANUAL-RTO: 0 /100 WBC (ref 0–0)
PLAT MORPH BLD: NORMAL
PLATELET # BLD AUTO: 228 10*3/MM3 (ref 130–400)
PMV BLD AUTO: 9.6 FL (ref 6–12)
POTASSIUM BLD-SCNC: 4.2 MMOL/L (ref 3.5–5.1)
PROT SERPL-MCNC: 6.9 G/DL (ref 6.3–8.2)
RBC # BLD AUTO: 4.14 10*6/MM3 (ref 4.7–6.1)
RBC MORPH BLD: NORMAL
SODIUM BLD-SCNC: 142 MMOL/L (ref 137–145)
WBC MORPH BLD: NORMAL
WBC NRBC COR # BLD: 6.09 10*3/MM3 (ref 4.8–10.8)

## 2018-03-02 PROCEDURE — 36415 COLL VENOUS BLD VENIPUNCTURE: CPT

## 2018-03-02 PROCEDURE — 99282 EMERGENCY DEPT VISIT SF MDM: CPT

## 2018-03-02 PROCEDURE — 0 TECHNETIUM MEDRONATE KIT: Performed by: INTERNAL MEDICINE

## 2018-03-02 PROCEDURE — 85007 BL SMEAR W/DIFF WBC COUNT: CPT | Performed by: EMERGENCY MEDICINE

## 2018-03-02 PROCEDURE — 80048 BASIC METABOLIC PNL TOTAL CA: CPT | Performed by: EMERGENCY MEDICINE

## 2018-03-02 PROCEDURE — 78306 BONE IMAGING WHOLE BODY: CPT

## 2018-03-02 PROCEDURE — A9503 TC99M MEDRONATE: HCPCS | Performed by: INTERNAL MEDICINE

## 2018-03-02 PROCEDURE — 85025 COMPLETE CBC W/AUTO DIFF WBC: CPT | Performed by: EMERGENCY MEDICINE

## 2018-03-02 PROCEDURE — 80076 HEPATIC FUNCTION PANEL: CPT | Performed by: EMERGENCY MEDICINE

## 2018-03-02 RX ORDER — TC 99M MEDRONATE 20 MG/10ML
24.7 INJECTION, POWDER, LYOPHILIZED, FOR SOLUTION INTRAVENOUS
Status: COMPLETED | OUTPATIENT
Start: 2018-03-02 | End: 2018-03-02

## 2018-03-02 RX ADMIN — TC 99M MEDRONATE 24.7 MILLICURIE: 20 INJECTION, POWDER, LYOPHILIZED, FOR SOLUTION INTRAVENOUS at 10:30

## 2018-03-02 NOTE — TELEPHONE ENCOUNTER
Lynnette from [facility] called with patient's appointment.      Patient is to see Dr. Arriaga on 03/06/18 @11am.    Patient to have a Net Spot Scan/[facility] on 03/08 @730 am.  [facility] is ordering this.      Patient is aware.

## 2018-03-02 NOTE — ED PROVIDER NOTES
Subjective   HPI Comments: 62 year old male presenting with back pain and leg weakness. He states that for the better part of two years he has had low back pain, no real alleviating or aggravating factors. Over the last several weeks it has become progressively worse, it radiates down his right leg. The last several days he has had constipation and right leg weakness/burning. He has been seen at the [place] [facility] and by Dr Figueroa, gastroenterology, for several complaints. He had imaging done that revealed bony lesions and liver lesions consistent with metastatic disease. It is unclear where the primary tumor is. He denies any fevers, nausea, vomiting, diarrhea, chest pain, shortness of breath. He does note a significant unintentional weight loss.      Review of Systems   Constitutional: Positive for unexpected weight change. Negative for chills and fever.   HENT: Negative for congestion, rhinorrhea and sore throat.    Eyes: Negative for pain.   Respiratory: Negative for cough and shortness of breath.    Cardiovascular: Negative for chest pain, palpitations and leg swelling.   Gastrointestinal: Positive for constipation. Negative for abdominal pain, diarrhea, nausea and vomiting.   Genitourinary: Negative for dysuria.   Musculoskeletal: Positive for arthralgias and back pain.   Skin: Negative for rash.   Neurological: Positive for weakness. Negative for numbness.   Psychiatric/Behavioral: Negative for behavioral problems.       Past Medical History:   Diagnosis Date   • Anemia    • Anxiety    • Arthritis    • Diabetes mellitus     BOARDERLINE NOT MEDS   • Hyperlipidemia    • Insomnia        Allergies   Allergen Reactions   • Amoxil [Amoxicillin] Hives       Past Surgical History:   Procedure Laterality Date   • CARDIAC CATHETERIZATION      7-8 yrs ago in [place]   • COLONOSCOPY  07/2016   • COLONOSCOPY N/A 2/5/2018    Procedure: COLONOSCOPY with cold snare polypectomy, cold biopsy polypectomy, and  biopsies;  Surgeon:  Wesley Carr MD;  Location: [facility] ENDOSCOPY;  Service:    • ENDOSCOPY  07/2016   • ENDOSCOPY N/A 2/5/2018    Procedure: ESOPHAGOGASTRODUODENOSCOPY with biopsies;  Surgeon: Wesley Carr MD;  Location: [facility] ENDOSCOPY;  Service:    • UPPER GASTROINTESTINAL ENDOSCOPY  07/2016       Family History   Problem Relation Age of Onset   • Hypertension Mother    • Diabetes Mother    • Heart attack Father    • Cancer Sister    • Heart attack Brother    • Hypertension Brother    • Diabetes Brother    • Cancer Brother    • Cancer Brother    • Cancer Brother    • Stomach cancer Cousin        Social History     Social History   • Marital status:      Social History Main Topics   • Smoking status: Current Every Day Smoker     Packs/day: 2.00     Years: 50.00     Types: Cigarettes   • Smokeless tobacco: Never Used   • Alcohol use No   • Drug use: No   • Sexual activity: Defer           Objective   Physical Exam   Constitutional: He is oriented to person, place, and time. No distress.   Thin, cachectic    HENT:   Head: Normocephalic and atraumatic.   Right Ear: External ear normal.   Left Ear: External ear normal.   Nose: Nose normal.   Mouth/Throat: Oropharynx is clear and moist.   Eyes: Conjunctivae and EOM are normal. Pupils are equal, round, and reactive to light.   Neck: Normal range of motion. Neck supple.   Cardiovascular: Normal rate, regular rhythm, normal heart sounds and intact distal pulses.    Pulmonary/Chest: Effort normal and breath sounds normal. No respiratory distress.   Abdominal: Soft. Bowel sounds are normal. He exhibits no distension. There is no tenderness. There is no rebound and no guarding.   Musculoskeletal: Normal range of motion. He exhibits no edema, tenderness or deformity.   Neurological: He is alert and oriented to person, place, and time.   Decreased strength with right hip flexion and foot dorsiflexion, sensation decreased on the right, reflexes diminished throughout, upper extremities  and left lower extremity normal   Skin: Skin is warm and dry. No rash noted.   Psychiatric: He has a normal mood and affect. His behavior is normal.   Nursing note and vitals reviewed.      Procedures         ED Course  ED Course                  MDM  Number of Diagnoses or Management Options  Chronic midline low back pain with right-sided sciatica:   Right leg weakness:   Diagnosis management comments: 62-year-old male with acutely worsening of chronic back pain and neurologic deficits.  Thin cachectic man in no distress with normal vital signs and exam otherwise as above.  Given the findings of his known bony lesions to his spine and his new neurologic symptoms and concerned about cord compression.  Will check MRI of his spine.  He declines any pain medication at this time.  Disposition pending workup.    DDX: Malignancy, cord compression, cauda equina, nerve root compression    Patient decided he did not want to wait on MRI. He tells me he has an appointment with oncology in four days. We discussed the risks of leaving to include permanent disability and paralysis and even death. He is understanding of those risks and comfortable with them. Encouraged him to return at any time if needed.      Final diagnoses:   Chronic midline low back pain with right-sided sciatica   Right leg weakness            Juaquin Bustillos MD  03/02/18 2043

## (undated) DEVICE — 2000CC GUARDIAN II: Brand: GUARDIAN

## (undated) DEVICE — SNAR POLYP SENSATION STDOVL 27 240 BX40

## (undated) DEVICE — ENDOGATOR AUXILIARY WATER JET CONNECTOR: Brand: ENDOGATOR

## (undated) DEVICE — TRAP,MUCUS SPECIMEN,40CC: Brand: MEDLINE

## (undated) DEVICE — JELLY,LUBE,STERILE,FLIP TOP,TUBE,2-OZ: Brand: MEDLINE

## (undated) DEVICE — FRCP BIOP COLD ENDOJAW ALLGTR W/NDL 2.8X2300MM BLU

## (undated) DEVICE — CONMED SCOPE SAVER BITE BLOCK, 20X27 MM: Brand: SCOPE SAVER

## (undated) DEVICE — Device